# Patient Record
Sex: FEMALE | Race: WHITE | NOT HISPANIC OR LATINO | URBAN - NONMETROPOLITAN AREA
[De-identification: names, ages, dates, MRNs, and addresses within clinical notes are randomized per-mention and may not be internally consistent; named-entity substitution may affect disease eponyms.]

---

## 2018-03-23 NOTE — PATIENT DISCUSSION
pt has no history of any scarring or laser done to the retina. Eval with Banker for cause and see if needs any treatment.

## 2018-04-10 NOTE — PATIENT DISCUSSION
No SRF, orange pigmentation seen on nevus today. Could be peripheral CNVM. B-Scan performed today. Diagnostics found no RT/RD.

## 2018-04-10 NOTE — PATIENT DISCUSSION
04/10/2018 (RETINA)- OCT at the next visit. No other diagnostics due to kidney transplant. Will follow up in 6 months for Comp R.

## 2021-12-10 ENCOUNTER — IMPORTED ENCOUNTER (OUTPATIENT)
Dept: URBAN - NONMETROPOLITAN AREA CLINIC 1 | Facility: CLINIC | Age: 51
End: 2021-12-10

## 2021-12-10 PROBLEM — H25.813: Noted: 2021-12-10

## 2021-12-10 PROBLEM — H52.4: Noted: 2021-12-10

## 2021-12-10 PROCEDURE — S0620 ROUTINE OPHTHALMOLOGICAL EXA: HCPCS

## 2021-12-10 NOTE — PATIENT DISCUSSION
Hyperopia / Presbyopia OU - Discussed diagnosis in detail with patient- New Glasses RX given today- Continue to monitor- RTC 1 year complete Cataracts OU- Discussed diagnosis in detail with patient- Discussed signs and symptoms of progression- Discussed UV protection- No treatment needed at this time - Continue to monitor

## 2022-04-10 ASSESSMENT — TONOMETRY
OS_IOP_MMHG: 16
OD_IOP_MMHG: 14

## 2022-04-10 ASSESSMENT — VISUAL ACUITY
OD_SC: 20/20
OS_SC: 20/20